# Patient Record
Sex: MALE
[De-identification: names, ages, dates, MRNs, and addresses within clinical notes are randomized per-mention and may not be internally consistent; named-entity substitution may affect disease eponyms.]

---

## 2017-04-14 ENCOUNTER — HOSPITAL ENCOUNTER (EMERGENCY)
Dept: HOSPITAL 14 - H.ER | Age: 67
Discharge: HOME | End: 2017-04-14
Payer: MEDICARE

## 2017-04-14 VITALS
DIASTOLIC BLOOD PRESSURE: 66 MMHG | RESPIRATION RATE: 18 BRPM | OXYGEN SATURATION: 100 % | HEART RATE: 109 BPM | SYSTOLIC BLOOD PRESSURE: 117 MMHG | TEMPERATURE: 98.5 F

## 2017-04-14 DIAGNOSIS — R19.7: ICD-10-CM

## 2017-04-14 DIAGNOSIS — Z95.1: ICD-10-CM

## 2017-04-14 DIAGNOSIS — K57.32: Primary | ICD-10-CM

## 2017-04-14 DIAGNOSIS — R10.30: ICD-10-CM

## 2017-04-14 LAB
ALBUMIN/GLOB SERPL: 1.1 {RATIO} (ref 1–2.1)
ALP SERPL-CCNC: 90 U/L (ref 38–126)
ALT SERPL-CCNC: 37 U/L (ref 21–72)
APTT BLD: 26.6 SECONDS (ref 23.3–32.5)
AST SERPL-CCNC: 38 U/L (ref 17–59)
BASOPHILS # BLD AUTO: 0 K/UL (ref 0–0.2)
BASOPHILS NFR BLD: 0.3 % (ref 0–2)
BILIRUB SERPL-MCNC: 1.4 MG/DL (ref 0.2–1.3)
BUN SERPL-MCNC: 18 MG/DL (ref 9–20)
CALCIUM SERPL-MCNC: 9.6 MG/DL (ref 8.4–10.2)
CHLORIDE SERPL-SCNC: 103 MMOL/L (ref 98–107)
CO2 SERPL-SCNC: 26 MMOL/L (ref 22–30)
EOSINOPHIL # BLD AUTO: 0 K/UL (ref 0–0.7)
EOSINOPHIL NFR BLD: 0.2 % (ref 0–4)
EOSINOPHIL NFR BLD: 1 % (ref 0–7)
ERYTHROCYTE [DISTWIDTH] IN BLOOD BY AUTOMATED COUNT: 13.2 % (ref 11.5–14.5)
GLOBULIN SER-MCNC: 4.1 GM/DL (ref 2.2–3.9)
GLUCOSE SERPL-MCNC: 103 MG/DL (ref 75–110)
HCT VFR BLD CALC: 46.4 % (ref 35–51)
LYMPHOCYTES # BLD AUTO: 0.4 K/UL (ref 1–4.3)
LYMPHOCYTES NFR BLD AUTO: 4.6 % (ref 20–40)
MCH RBC QN AUTO: 31.7 PG (ref 27–31)
MCHC RBC AUTO-ENTMCNC: 33.9 G/DL (ref 33–37)
MCV RBC AUTO: 93.6 FL (ref 80–94)
MONOCYTES # BLD: 1.2 K/UL (ref 0–0.8)
MONOCYTES NFR BLD: 12.6 % (ref 0–10)
NEUTROPHILS # BLD: 7.9 K/UL (ref 1.8–7)
NEUTROPHILS NFR BLD AUTO: 77 % (ref 42–75)
NEUTROPHILS NFR BLD AUTO: 82.3 % (ref 50–75)
NRBC BLD AUTO-RTO: 0.1 % (ref 0–0)
PLATELET # BLD: 217 K/UL (ref 130–400)
PMV BLD AUTO: 8.2 FL (ref 7.2–11.7)
POTASSIUM SERPL-SCNC: 4.8 MMOL/L (ref 3.6–5)
PROT SERPL-MCNC: 8.4 G/DL (ref 6.3–8.2)
SODIUM SERPL-SCNC: 143 MMOL/L (ref 132–148)
TOTAL CELLS COUNTED BLD: 100
WBC # BLD AUTO: 9.6 K/UL (ref 4.8–10.8)

## 2017-04-14 PROCEDURE — 85610 PROTHROMBIN TIME: CPT

## 2017-04-14 PROCEDURE — 99282 EMERGENCY DEPT VISIT SF MDM: CPT

## 2017-04-14 PROCEDURE — 84484 ASSAY OF TROPONIN QUANT: CPT

## 2017-04-14 PROCEDURE — 85730 THROMBOPLASTIN TIME PARTIAL: CPT

## 2017-04-14 PROCEDURE — 85025 COMPLETE CBC W/AUTO DIFF WBC: CPT

## 2017-04-14 PROCEDURE — 74177 CT ABD & PELVIS W/CONTRAST: CPT

## 2017-04-14 PROCEDURE — 80053 COMPREHEN METABOLIC PANEL: CPT

## 2017-04-14 PROCEDURE — 96374 THER/PROPH/DIAG INJ IV PUSH: CPT

## 2017-04-14 NOTE — CT
PROCEDURE:  CT Abdomen and Pelvis with contrast



HISTORY:

Lower abdominal pain and diarrhea. 



COMPARISON:

01/28/2014.



TECHNIQUE:

Contrast dose: 95 cc Omnipaque 300.



Radiation dose:



Total exam DLP = 649.95 mGy-cm.



This CT exam was performed using one or more of the following dose 

reduction techniques: Automated exposure control, adjustment of the 

mA and/or kV according to patient size, and/or use of iterative 

reconstruction technique.



FINDINGS:



LOWER THORAX:

Unremarkable. 



LIVER:

Unremarkable. No gross lesion or ductal dilatation. 



GALLBLADDER AND BILE DUCTS:

Unremarkable. 



PANCREAS:

Unremarkable. No gross lesion or ductal dilatation.



SPLEEN:

Unremarkable. 



ADRENALS:

Unremarkable. No mass. 



KIDNEYS AND URETERS:

Right kidney: Nonobstructing 2 mm calculus upper pole.  Incidental 

subcentimeter cyst upper pole right kidney. 



Left kidney: Nonobstructing 2 mm calculus midpole region. 







VASCULATURE:

Unremarkable. No aortic aneurysm. 



BOWEL:

Segmental thickening of the sigmoid colon with mild inflammatory 

changes. The location in the presence of extensive diverticular 

disease likely reflects acute segmental diverticulitis. Sparing of 

the rectum.



More proximally, copious amounts of fluid throughout colon without 

evidence of mechanical obstruction. 



APPENDIX:

Normal appendix. 



PERITONEUM:

Unremarkable. No free fluid. No free air. 



LYMPH NODES:

Unremarkable. No enlarged lymph nodes. 



BLADDER:

Unremarkable. 



REPRODUCTIVE:

Unremarkable. 



BONES:

No acute fracture. Scoliosis without appreciable secondary 

degenerative change. 



OTHER FINDINGS:

None.



IMPRESSION:

Acute sigmoid diverticulitis. No evidence of mechanical bowel 

obstruction. Additional benign and/or incidental findings described 

above.

## 2017-04-14 NOTE — ED PDOC
HPI: Abdomen


Time Seen by Provider: 04/14/17 13:22


Chief Complaint (Nursing): Abdominal Pain


Chief Complaint (Provider): lower abdominal pain


History Per: Patient


History/Exam Limitations: no limitations


Severity: Moderate


Location Of Pain/Discomfort: LLQ, Suprapubic


Quality Of Discomfort: Sharp


Associated Symptoms: Nausea, Diarrhea, Loss Of Appetite.  denies: Vomiting, 

Back Pain, Chest Pain, Constipation, Urinary Symptoms


Alleviating Factors: None


Last Bowel Movement: Today


Additional Complaint(s): 


66yo male c/o lower abdominal pain and watery diarrhea for last 3 days. Denies 

back pain, fever or blood in stool.  No prior history of similar pain. 





Past Medical History


Reviewed: Historical Data, Nursing Documentation, Vital Signs


Vital Signs: 





 Last Vital Signs











Temp  98.5 F   04/14/17 13:16


 


Pulse  109 H  04/14/17 13:16


 


Resp  18   04/14/17 13:16


 


BP  117/66   04/14/17 13:16


 


Pulse Ox  100   04/14/17 13:16














- Medical History


PMH: Diverticulitis





- Surgical History


Surgical History: CABG





- Family History


Family History: States: Unknown Family Hx





- Living Arrangements


Living Arrangements: With Family





- Social History


Current smoker - smoking cessation education provided: No





- Home Medications


Home Medications: 


 Ambulatory Orders











 Medication  Instructions  Recorded


 


Ciprofloxacin [Cipro] 500 mg PO BID #14 tab 04/14/17


 


Naproxen [Naprosyn] 500 mg PO BID PRN #14 tablet 04/14/17


 


metroNIDAZOLE [Flagyl] 500 mg PO TID #21 tab 04/14/17


 


traMADol [Ultram] 50 mg PO TID PRN #12 tab 04/14/17














- Allergies


Allergies/Adverse Reactions: 


 Allergies











Allergy/AdvReac Type Severity Reaction Status Date / Time


 


No Known Allergies Allergy   Verified 04/14/17 13:16














Review of Systems


ROS Statement: Except As Marked, All Systems Reviewed And Found Negative


Constitutional: Negative for: Fever, Chills


ENT: Negative for: Ear Pain, Nose Pain


Gastrointestinal: Positive for: Nausea, Abdominal Pain, Diarrhea.  Negative for

: Vomiting, Constipation, Melena, Hematochezia, Hematemesis, Rectal Pain


Genitourinary Male: Negative for: Dysuria, Frequency


Musculoskeletal: Negative for: Neck Pain, Shoulder Pain, Back Pain


Skin: Negative for: Rash, Lesions, Jaundice


Neurological: Negative for: Weakness, Numbness, Headache, Dizziness


Psych: Negative for: Depression





Physical Exam





- Reviewed


Nursing Documentation Reviewed: Yes


Vital Signs Reviewed: Yes





- Physical Exam


Appears: Positive for: Well, Non-toxic, No Acute Distress


Head Exam: Positive for: ATRAUMATIC, NORMAL INSPECTION, NORMOCEPHALIC


Skin: Positive for: Normal Color, Warm, DRY


Eye Exam: Positive for: EOMI, Normal appearance, PERRL


ENT: Positive for: Normal ENT Inspection


Neck: Positive for: Normal, Painless ROM


Cardiovascular/Chest: Positive for: Regular Rate, Rhythm


Respiratory: Positive for: CNT, Normal Breath Sounds


Gastrointestinal/Abdominal: Positive for: Bowel Sounds, Soft, Tenderness (+LLQ)

.  Negative for: Guarding, Rebound


Back: Positive for: Normal Inspection


Extremity: Positive for: Normal ROM


Neurologic/Psych: Positive for: Alert, CNs II-XII (intact), Oriented.  Negative 

for: Motor/Sensory Deficits





- Laboratory Results


Result Diagrams: 


 04/14/17 14:32





 04/14/17 14:32





- ECG


O2 Sat by Pulse Oximetry: 100





Medical Decision Making


Medical Decision Making: 


abdominal workup initiated w/ bloodwork and CT abd pelv r/o diverticulitis.


Pain meds and IVF initiated.





labs reviewed, WBC normal.


Chem clinically unremarkable.





CT abd pelv 





Accession No. : [id number]


Patient Name / ID : BRANDON FLEMING  / [id number]


Exam Date : 04/14/2017 15:14:45 ( Approved )


Study Comment : 


Sex / Age : F  / 78Y





Creator : Golden Ricketts MD


Dictator : Golden Ricketts MD


 : 


Approver : Golden Ricketts MD


Approver2 : 





Report Date : 04/14/2017 16:07:29


My Comment : 


********************************************************************************

***





PROCEDURE:  CT MAXILLOFACIAL BONES WITHOUT CONTRAST





HISTORY:


facial trauma s/p fall





COMPARISON:


None





TECHNIQUE:


Contiguous axial CT  images of the maxillofacial bones were obtained. Coronal 

and sagittal reformats were generated.





Radiation dose:





Total exam DLP = 742.74 mGy-cm.





This CT exam was performed using one or more of the following dose reduction 

techniques: Automated exposure control, adjustment of the mA and/or kV 

according to patient size, and/or use of iterative reconstruction technique.





FINDINGS:





NASAL BONES:


No evidence of displaced fracture.  Soft tissue swelling about the nasal bones.





ORBITS:


Unremarkable.





PARANASAL SINUSES/ MASTOIDS:


Clear.





MAXILLA:


Midline maxillary fracture extending to the base of the hard palate. Adjacent 

central incisors appear to be aligned with the maxillary fracture. Soft tissue 

swelling attests to the acuity of the fracture. No evidence of focal foreign 

body.  There may be small foreign bodies imbedded in the right cheek.





MANDIBLE/ TEMPOROMANDIBULAR JOINTS:


Unremarkable.





SKULL BASE:


Unremarkable.





TEMPORAL BONES:


Middle ears and mastoid grossly unremarkable.





OTHER FINDINGS:


None.





IMPRESSION:


Midline fracture of the maxilla. Fracture runs through the central incisors 

which appear to be in near anatomic location. No foreign bodies identified at 

this location. 





Communication of results: I discussed findings directly with Dr. Clarke at the 

time of this interpretation





-------------


pt without fever, normal WBC and pain improved in ED w fluids and toradol only.


Stable for outpatient treatment. Cipro/Flagyl initiated.


Discussed results and recommendations in Croatian.


States had colonoscopy 4yrs ago, due to followup GI soon.  Recommended see his 

GI doc at [facility] in 5-6 days.  


Return ER for any fever, worse pain, blood in stool or any concern. 





Disposition





- Clinical Impression


Clinical Impression: 


 Abdominal pain, Diverticulitis





- Patient ED Disposition


Is Patient to be Admitted: No


Counseled Patient/Family Regarding: Studies Performed, Diagnosis, Need For 

Followup





- Disposition


Referrals: 


Mj DOUGHERTY,MD Manny [Medical Doctor] - 


Disposition: Routine/Home


Disposition Time: 16:55


Condition: STABLE


Additional Instructions: 


Drink plenty of fluids.


Take antibiotics as directed.


Return to ER for any worse pain, fever, blood in stool, or any concern.





Prescriptions: 


Ciprofloxacin [Cipro] 500 mg PO BID #14 tab


metroNIDAZOLE [Flagyl] 500 mg PO TID #21 tab


Naproxen [Naprosyn] 500 mg PO BID PRN #14 tablet


 PRN Reason: Pain, Moderate (4-7)


traMADol [Ultram] 50 mg PO TID PRN #12 tab


 PRN Reason: Pain, Moderate (4-7)


Instructions:  Diverticulitis (ED)


Print Language: Maori

## 2018-05-29 ENCOUNTER — HOSPITAL ENCOUNTER (EMERGENCY)
Dept: HOSPITAL 14 - H.ER | Age: 68
LOS: 1 days | Discharge: HOME | End: 2018-05-30
Payer: MEDICARE

## 2018-05-29 DIAGNOSIS — I10: ICD-10-CM

## 2018-05-29 DIAGNOSIS — Z95.1: ICD-10-CM

## 2018-05-29 DIAGNOSIS — E78.00: ICD-10-CM

## 2018-05-29 DIAGNOSIS — K52.9: Primary | ICD-10-CM

## 2018-05-29 DIAGNOSIS — N20.0: ICD-10-CM

## 2018-05-29 LAB
ALBUMIN SERPL-MCNC: 4.2 G/DL (ref 3.5–5)
ALBUMIN/GLOB SERPL: 1.2 {RATIO} (ref 1–2.1)
ALT SERPL-CCNC: 76 U/L (ref 21–72)
APTT BLD: 31.7 SECONDS (ref 25.6–37.1)
AST SERPL-CCNC: 61 U/L (ref 17–59)
BASOPHILS # BLD AUTO: 0 K/UL (ref 0–0.2)
BASOPHILS NFR BLD: 0.7 % (ref 0–2)
BUN SERPL-MCNC: 14 MG/DL (ref 9–20)
CALCIUM SERPL-MCNC: 9.5 MG/DL (ref 8.4–10.2)
EOSINOPHIL # BLD AUTO: 0.1 K/UL (ref 0–0.7)
EOSINOPHIL NFR BLD: 2.3 % (ref 0–4)
ERYTHROCYTE [DISTWIDTH] IN BLOOD BY AUTOMATED COUNT: 13.4 % (ref 11.5–14.5)
GFR NON-AFRICAN AMERICAN: > 60
HGB BLD-MCNC: 15.1 G/DL (ref 12–18)
INR PPP: 1 (ref 0.9–1.2)
LIPASE SERPL-CCNC: 70 U/L (ref 23–300)
LYMPHOCYTES # BLD AUTO: 1.3 K/UL (ref 1–4.3)
LYMPHOCYTES NFR BLD AUTO: 26 % (ref 20–40)
MCH RBC QN AUTO: 31 PG (ref 27–31)
MCHC RBC AUTO-ENTMCNC: 33.7 G/DL (ref 33–37)
MCV RBC AUTO: 92.1 FL (ref 80–94)
MONOCYTES # BLD: 0.9 K/UL (ref 0–0.8)
MONOCYTES NFR BLD: 17.4 % (ref 0–10)
NEUTROPHILS # BLD: 2.7 K/UL (ref 1.8–7)
NEUTROPHILS NFR BLD AUTO: 53.6 % (ref 50–75)
NRBC BLD AUTO-RTO: 0.2 % (ref 0–0)
PLATELET # BLD: 229 K/UL (ref 130–400)
PMV BLD AUTO: 8.1 FL (ref 7.2–11.7)
PROTHROMBIN TIME: 11.2 SECONDS (ref 9.8–13.1)
RBC # BLD AUTO: 4.86 MIL/UL (ref 4.4–5.9)
WBC # BLD AUTO: 5 K/UL (ref 4.8–10.8)

## 2018-05-29 PROCEDURE — 99284 EMERGENCY DEPT VISIT MOD MDM: CPT

## 2018-05-29 PROCEDURE — 83690 ASSAY OF LIPASE: CPT

## 2018-05-29 PROCEDURE — 74177 CT ABD & PELVIS W/CONTRAST: CPT

## 2018-05-29 PROCEDURE — 85730 THROMBOPLASTIN TIME PARTIAL: CPT

## 2018-05-29 PROCEDURE — 80053 COMPREHEN METABOLIC PANEL: CPT

## 2018-05-29 PROCEDURE — 85610 PROTHROMBIN TIME: CPT

## 2018-05-29 PROCEDURE — 96360 HYDRATION IV INFUSION INIT: CPT

## 2018-05-29 PROCEDURE — 85025 COMPLETE CBC W/AUTO DIFF WBC: CPT

## 2018-05-29 PROCEDURE — 87040 BLOOD CULTURE FOR BACTERIA: CPT

## 2018-05-29 NOTE — ED PDOC
HPI: Abdomen


Time Seen by Provider: 05/29/18 22:40


Chief Complaint (Nursing): Abdominal Pain


Chief Complaint (Provider): Abdominal pain, diarrhea


History Per: Patient


History/Exam Limitations: no limitations


Onset/Duration Of Symptoms: Persistent (1 month)


Current Symptoms Are (Timing): Still Present


Location Of Pain/Discomfort: Diffuse


Associated Symptoms: Diarrhea, Other (weight loss).  denies: Fever, Chills, 

Nausea, Vomiting


Additional History Per: Patient


Additional Complaint(s): 


69yo  male, with history of hypertension and diverticular disease, 

presents to ED with complaints of diarrhea and abdominal pain, worsening over 

the past month. Patient states the diarrhea is non-bloody but dark in color; he 

also reports the abdominal pain as cramping pain and states it is 

intermittently present. Patient reports a poor appetite and states he has lost 

around 11lbs over the past month. He denies any associated fever, chills, 

nausea or vomiting. He has no other medical complaints. 





PMD: None provided





Past Medical History


Reviewed: Historical Data, Nursing Documentation, Vital Signs


Vital Signs: 


 Last Vital Signs











Temp  98.2 F   05/29/18 20:57


 


Pulse  81   05/29/18 20:57


 


Resp  18   05/29/18 20:57


 


BP  104/71   05/29/18 20:57


 


Pulse Ox  99   05/30/18 01:38














- Medical History


PMH: Diverticulitis, HTN, Hypercholesterolemia





- Surgical History


Surgical History: CABG





- Family History


Family History: States: Unknown Family Hx





- Social History


Current smoker - smoking cessation education provided: No


Alcohol: None


Drugs: Denies





- Home Medications


Home Medications: 


 Ambulatory Orders











 Medication  Instructions  Recorded


 


Famotidine [Pepcid] 20 mg PO BID #20 tab 11/01/17


 


Ibuprofen [Motrin] 600 mg PO TID #21 tab 11/01/17


 


Dicyclomine [Bentyl] 20 mg PO Q12 PRN #20 tab 05/30/18














- Allergies


Allergies/Adverse Reactions: 


 Allergies











Allergy/AdvReac Type Severity Reaction Status Date / Time


 


No Known Allergies Allergy   Verified 04/14/17 13:16














Review of Systems


ROS Statement: Except As Marked, All Systems Reviewed And Found Negative


Constitutional: Negative for: Fever, Chills


Gastrointestinal: Positive for: Abdominal Pain, Diarrhea.  Negative for: Nausea

, Vomiting





Physical Exam





- Reviewed


Nursing Documentation Reviewed: Yes


Vital Signs Reviewed: Yes





- Physical Exam


Appears: Positive for: Non-toxic, No Acute Distress


Head Exam: Positive for: ATRAUMATIC, NORMAL INSPECTION, NORMOCEPHALIC


Skin: Positive for: Normal Color


Eye Exam: Positive for: Normal appearance


Neck: Positive for: Supple


Cardiovascular/Chest: Positive for: Regular Rate, Rhythm


Respiratory: Positive for: Normal Breath Sounds


Gastrointestinal/Abdominal: Positive for: Soft, Tenderness (diffuse).  Negative 

for: Mass, Guarding, Rebound


Extremity: Positive for: Normal ROM


Neurologic/Psych: Positive for: Alert, Oriented.  Negative for: Motor/Sensory 

Deficits





- Laboratory Results


Result Diagrams: 


 05/29/18 23:20





 05/29/18 23:20





- ECG


O2 Sat by Pulse Oximetry: 99 (RA)


Pulse Ox Interpretation: Normal





Medical Decision Making


Medical Decision Making: 


Impression: Abdominal pain, diarrhea and weight loss


Plan:


-- Labs


-- CT Abdomen and Pelvis w/ IV contrast


-- Bentyl 20mg PO


-- IV Fluids





Progress:


0137


Labs reviewed and show no clinically significant abnormalities.





CT Abdomen/Pelvis FINDINGS:


LUNG BASES: No significant abnormality seen.


ABDOMEN:


LIVER: No acute abnormality of the liver identified.


GALLBLADDER AND BILE DUCTS: No CT evidence of acute cholecystitis. No evidence 

of


significant biliary ductal dilatation.


PANCREAS: No CT evidence of acute pancreatitis.


SPLEEN: No acute abnormality of the spleen identified.


ADRENALS: No acute abnormality of the adrenal glands identified.


KIDNEYS AND URETERS: 4 mm nonobstructing left renal stone. No evidence of


hydroureteronephrosis.


STOMACH AND BOWEL: Fecalization and mild dilatation of multiple distal small 

bowel loops is


seen. There is no definite transition point seen in the small bowel. There is 

no evidence of diffuse


small bowel dilatation. Findings are most likely due to a mild ileus of the 

small bowel. Colonic


diverticulosis, with no evidence of acute diverticulitis. Otherwise, no 

significant abnormality of the


bowel is identified. No acute abnormality of the stomach or duodenum identified.


PELVIS:


APPENDIX: Appendix is seen, and is within normal limits in appearance.


BLADDER: No acute abnormality of the bladder identified.


REPRODUCTIVE: Prostate gland is enlarged.


ABDOMEN and PELVIS:


INTRAPERITONEAL SPACE: Round, cystic mass measuring 1.5 x 1 is seen in the 

right anterior


pelvis. This is located just above the inguinal canal and is most likely a 

postsurgical lymphocele. No


associated air.


BONES/JOINTS: Scoliotic curvature of the spine. Bony structures appear 

demineralized. No acute


fractures or other acute bony abnormality visualized.


SOFT TISSUES: SOFT TISSUES: No evidence of abdominal wall hernia containing 

bowel.


VASCULATURE: No evidence of abdominal aortic aneurysm. No evidence of periaortic


hemorrhage.


LYMPH NODES: No evidence of diffuse lymphadenopathy.


IMPRESSION:


- Small bowel findings which are most likely due to an ileus of the small bowel.


- Otherwise, no evidence of significant acute process.


- See above for multiple remaining non-emergent findings.





Patient informed of lab and CT findings. Patient stable for discharge home, 

instructed to follow up with GI specialist (referral given). 


Diagnosis: Gastroenteritis





Scribe Attestation:


Documented by Kristina Bermudez, acting as a scribe for Sanford Saldaña MD





Provider Attestation:


All medical record entries made by the Scribe were at my direction and 

personally dictated by me. I have reviewed the chart and agree that the record 

accurately reflects my personal performance of the history, physical exam, 

medical decision making, and the department course for this patient. I have 

also personally directed, reviewed, and agree with the discharge instructions 

and disposition.





Disposition





- Clinical Impression


Clinical Impression: 


 Gastroenteritis








- Disposition


Referrals: 


Bernardo Foster MD [Staff Provider] - 


Disposition: Routine/Home


Disposition Time: 01:34


Condition: STABLE


Prescriptions: 


Dicyclomine [Bentyl] 20 mg PO Q12 PRN #20 tab


 PRN Reason: abdominal pain/diarrhea


Instructions:  Gastroenteritis (ED)


Forms:  [facility] (English)


Print Language: Albanian

## 2018-05-30 VITALS
SYSTOLIC BLOOD PRESSURE: 125 MMHG | TEMPERATURE: 98.3 F | HEART RATE: 82 BPM | DIASTOLIC BLOOD PRESSURE: 82 MMHG | RESPIRATION RATE: 17 BRPM | OXYGEN SATURATION: 100 %

## 2018-05-30 NOTE — CT
EXAM:

  CT Abdomen and Pelvis With Intravenous Contrast



EXAM DATE/TIME:

  5/29/2018 10:49 PM



CLINICAL HISTORY:

  68 years old, male; Pain; Abdominal pain; Generalized; Additional info: Abd 

pain



TECHNIQUE:

  Axial computed tomography images of the abdomen and pelvis with intravenous 

contrast.  All CT scans at this facility use one or more dose reduction 

techniques, viz.: automated exposure control; ma/kV adjustment per patient size 

(including targeted exams where dose is matched to indication; i.e. head); or 

iterative reconstruction technique.

  Coronal and sagittal reformatted images were created and reviewed.



CONTRAST:

  90 mL of [id number] administered intravenously.



COMPARISON:

   Prior CT abdomen and pelvis of 2017-04-14 



FINDINGS:

  LUNG BASES: No significant abnormality seen.



 ABDOMEN:

  LIVER:  No acute abnormality of the liver identified.

  GALLBLADDER AND BILE DUCTS:  No CT evidence of acute cholecystitis.  No 

evidence of significant biliary ductal dilatation.

  PANCREAS:  No CT evidence of acute pancreatitis.

  SPLEEN:  No acute abnormality of the spleen identified.

  ADRENALS:  No acute abnormality of the adrenal glands identified.

  KIDNEYS AND URETERS:  4 mm nonobstructing left renal stone.  No evidence of 

hydroureteronephrosis.

  STOMACH AND BOWEL:  Fecalization and mild dilatation of multiple distal small 

bowel loops is seen.  There is no definite transition point seen in the small 

bowel.  There is no evidence of diffuse small bowel dilatation.  Findings are 

most likely due to a mild ileus of the small bowel. Colonic diverticulosis, 

with no evidence of acute diverticulitis.    Otherwise, no significant 

abnormality of the bowel is identified.  No acute abnormality of the stomach or 

duodenum identified.



 PELVIS:

  APPENDIX:  Appendix is seen, and is within normal limits in appearance.

  BLADDER:  No acute abnormality of the bladder identified.

  REPRODUCTIVE:  Prostate gland is enlarged.



 ABDOMEN and PELVIS:

  INTRAPERITONEAL SPACE:  Round, cystic mass measuring 1.5 x 1 is seen in the 

right anterior pelvis. This is located just above the inguinal canal and is 

most likely a postsurgical lymphocele. No associated air.

  BONES/JOINTS:  Scoliotic curvature of the spine.  Bony structures appear 

demineralized.  No acute fractures or other acute bony abnormality visualized.

  SOFT TISSUES:  SOFT TISSUES: No evidence of abdominal wall hernia containing 

bowel.  

  VASCULATURE:  No evidence of abdominal aortic aneurysm. No evidence of 

periaortic hemorrhage.

  LYMPH NODES:  No evidence of diffuse lymphadenopathy.



IMPRESSION:     

- Small bowel findings which are most likely due to an ileus of the small bowel.

- Otherwise, no evidence of significant acute process.

- See above for multiple remaining non-emergent findings.

## 2019-04-18 ENCOUNTER — HOSPITAL ENCOUNTER (OUTPATIENT)
Dept: HOSPITAL 31 - C.ENDO | Age: 69
Discharge: HOME | End: 2019-04-18
Attending: INTERNAL MEDICINE
Payer: MEDICARE

## 2019-04-18 VITALS — TEMPERATURE: 98 F

## 2019-04-18 VITALS — BODY MASS INDEX: 23.6 KG/M2

## 2019-04-18 VITALS
DIASTOLIC BLOOD PRESSURE: 70 MMHG | SYSTOLIC BLOOD PRESSURE: 115 MMHG | OXYGEN SATURATION: 100 % | HEART RATE: 65 BPM | RESPIRATION RATE: 20 BRPM

## 2019-04-18 DIAGNOSIS — K64.1: ICD-10-CM

## 2019-04-18 DIAGNOSIS — K57.30: ICD-10-CM

## 2019-04-18 DIAGNOSIS — K29.30: ICD-10-CM

## 2019-04-18 DIAGNOSIS — D12.4: ICD-10-CM

## 2019-04-18 DIAGNOSIS — D12.2: ICD-10-CM

## 2019-04-18 DIAGNOSIS — K44.9: ICD-10-CM

## 2019-04-18 DIAGNOSIS — D12.0: Primary | ICD-10-CM

## 2019-04-18 DIAGNOSIS — Z86.010: ICD-10-CM

## 2019-04-18 PROCEDURE — 43239 EGD BIOPSY SINGLE/MULTIPLE: CPT

## 2019-04-18 PROCEDURE — 88342 IMHCHEM/IMCYTCHM 1ST ANTB: CPT

## 2019-04-18 PROCEDURE — 88313 SPECIAL STAINS GROUP 2: CPT

## 2019-04-18 PROCEDURE — 45380 COLONOSCOPY AND BIOPSY: CPT

## 2019-04-18 PROCEDURE — 88305 TISSUE EXAM BY PATHOLOGIST: CPT

## 2019-04-18 PROCEDURE — 45385 COLONOSCOPY W/LESION REMOVAL: CPT
